# Patient Record
Sex: MALE | Race: ASIAN | URBAN - METROPOLITAN AREA
[De-identification: names, ages, dates, MRNs, and addresses within clinical notes are randomized per-mention and may not be internally consistent; named-entity substitution may affect disease eponyms.]

---

## 2019-10-17 ENCOUNTER — OFFICE VISIT (OUTPATIENT)
Dept: URBAN - METROPOLITAN AREA CLINIC 32 | Facility: CLINIC | Age: 24
End: 2019-10-17
Payer: COMMERCIAL

## 2019-10-17 DIAGNOSIS — H52.13 MYOPIA, BILATERAL: Primary | ICD-10-CM

## 2019-10-17 PROCEDURE — 92310 CONTACT LENS FITTING OU: CPT | Performed by: OPTOMETRIST

## 2019-10-17 PROCEDURE — 92004 COMPRE OPH EXAM NEW PT 1/>: CPT | Performed by: OPTOMETRIST

## 2019-10-17 ASSESSMENT — KERATOMETRY
OS: 45.00
OD: 46.00

## 2019-10-17 ASSESSMENT — INTRAOCULAR PRESSURE
OD: 15
OS: 14

## 2019-10-17 ASSESSMENT — VISUAL ACUITY
OS: 20/20
OD: 20/20

## 2021-05-17 ENCOUNTER — OFFICE VISIT (OUTPATIENT)
Dept: URBAN - METROPOLITAN AREA CLINIC 29 | Facility: CLINIC | Age: 26
End: 2021-05-17
Payer: COMMERCIAL

## 2021-05-17 PROCEDURE — 92310 CONTACT LENS FITTING OU: CPT | Performed by: OPTOMETRIST

## 2021-05-17 PROCEDURE — 92014 COMPRE OPH EXAM EST PT 1/>: CPT | Performed by: OPTOMETRIST

## 2021-05-17 ASSESSMENT — VISUAL ACUITY
OS: 20/20
OD: 20/20

## 2022-08-09 ENCOUNTER — OFFICE VISIT (OUTPATIENT)
Dept: URBAN - METROPOLITAN AREA CLINIC 22 | Facility: CLINIC | Age: 27
End: 2022-08-09
Payer: COMMERCIAL

## 2022-08-09 DIAGNOSIS — H52.13 MYOPIA, BILATERAL: Primary | ICD-10-CM

## 2022-08-09 PROCEDURE — 92310 CONTACT LENS FITTING OU: CPT | Performed by: STUDENT IN AN ORGANIZED HEALTH CARE EDUCATION/TRAINING PROGRAM

## 2022-08-09 PROCEDURE — 92014 COMPRE OPH EXAM EST PT 1/>: CPT | Performed by: STUDENT IN AN ORGANIZED HEALTH CARE EDUCATION/TRAINING PROGRAM

## 2022-08-09 ASSESSMENT — VISUAL ACUITY
OD: 20/20
OS: 20/20

## 2022-08-09 ASSESSMENT — INTRAOCULAR PRESSURE
OD: 20
OS: 20

## 2022-08-09 ASSESSMENT — KERATOMETRY
OD: 45.25
OS: 45.13

## 2022-08-09 NOTE — IMPRESSION/PLAN
Impression: Myopia, bilateral: H52.13. Plan: Discussed findings. New glasses and contact lens Rx finalized and given to patient. Reviewed CL hygiene and discussed risks of sleeping in CLs. Patient expressed understanding. Reviewed s/sx of RD and instructed patient to contact clinic immediately if experiencing.